# Patient Record
Sex: FEMALE | Race: WHITE | NOT HISPANIC OR LATINO | Employment: FULL TIME | URBAN - METROPOLITAN AREA
[De-identification: names, ages, dates, MRNs, and addresses within clinical notes are randomized per-mention and may not be internally consistent; named-entity substitution may affect disease eponyms.]

---

## 2017-01-05 ENCOUNTER — ALLSCRIPTS OFFICE VISIT (OUTPATIENT)
Dept: OTHER | Facility: OTHER | Age: 52
End: 2017-01-05

## 2017-01-09 ENCOUNTER — HOSPITAL ENCOUNTER (OUTPATIENT)
Dept: MAMMOGRAPHY | Facility: CLINIC | Age: 52
Discharge: HOME | End: 2017-01-09
Payer: COMMERCIAL

## 2017-01-09 DIAGNOSIS — Z12.31 ENCOUNTER FOR SCREENING MAMMOGRAM FOR MALIGNANT NEOPLASM OF BREAST: ICD-10-CM

## 2017-01-09 DIAGNOSIS — R92.8 OTHER ABNORMAL AND INCONCLUSIVE FINDINGS ON DIAGNOSTIC IMAGING OF BREAST: ICD-10-CM

## 2017-01-09 PROCEDURE — G0206 DX MAMMO INCL CAD UNI: HCPCS

## 2017-01-09 PROCEDURE — G0202 SCR MAMMO BI INCL CAD: HCPCS

## 2017-01-11 ENCOUNTER — ALLSCRIPTS OFFICE VISIT (OUTPATIENT)
Dept: OTHER | Facility: OTHER | Age: 52
End: 2017-01-11

## 2017-01-11 ENCOUNTER — GENERIC CONVERSION - ENCOUNTER (OUTPATIENT)
Dept: OTHER | Facility: OTHER | Age: 52
End: 2017-01-11

## 2017-01-12 ENCOUNTER — GENERIC CONVERSION - ENCOUNTER (OUTPATIENT)
Dept: OTHER | Facility: OTHER | Age: 52
End: 2017-01-12

## 2017-01-17 LAB
. (HISTORICAL): NORMAL

## 2017-01-26 ENCOUNTER — GENERIC CONVERSION - ENCOUNTER (OUTPATIENT)
Dept: OTHER | Facility: OTHER | Age: 52
End: 2017-01-26

## 2017-06-01 DIAGNOSIS — R92.1 MAMMOGRAPHIC CALCIFICATION FOUND ON DIAGNOSTIC IMAGING OF BREAST: ICD-10-CM

## 2017-08-01 ENCOUNTER — HOSPITAL ENCOUNTER (OUTPATIENT)
Dept: MAMMOGRAPHY | Facility: CLINIC | Age: 52
Discharge: HOME | End: 2017-08-01
Payer: COMMERCIAL

## 2017-08-01 DIAGNOSIS — R92.1 MAMMOGRAPHIC CALCIFICATION FOUND ON DIAGNOSTIC IMAGING OF BREAST: ICD-10-CM

## 2017-08-01 PROCEDURE — G0206 DX MAMMO INCL CAD UNI: HCPCS

## 2017-08-04 ENCOUNTER — GENERIC CONVERSION - ENCOUNTER (OUTPATIENT)
Dept: OTHER | Facility: OTHER | Age: 52
End: 2017-08-04

## 2018-01-01 DIAGNOSIS — R92.8 OTHER ABNORMAL AND INCONCLUSIVE FINDINGS ON DIAGNOSTIC IMAGING OF BREAST: ICD-10-CM

## 2018-01-01 DIAGNOSIS — N93.9 ABNORMAL UTERINE AND VAGINAL BLEEDING, UNSPECIFIED (CODE): ICD-10-CM

## 2018-01-01 DIAGNOSIS — R92.2 INCONCLUSIVE MAMMOGRAM: ICD-10-CM

## 2018-01-01 DIAGNOSIS — R92.1 MAMMOGRAPHIC CALCIFICATION FOUND ON DIAGNOSTIC IMAGING OF BREAST: ICD-10-CM

## 2018-01-01 DIAGNOSIS — Z12.31 ENCOUNTER FOR SCREENING MAMMOGRAM FOR MALIGNANT NEOPLASM OF BREAST: ICD-10-CM

## 2018-01-11 NOTE — MISCELLANEOUS
Message  Spoke with patient regarding R breast imaging and biopsy from yesterday  Microcalcifications are stable; follow-up diagnostic mammogram in 6 months  Changes to previously seen cystic lesion that may represent a solid mass instead  Core biopsy performed  Pathology pending  Patient states area is tender, but no signs of infection  We will call when pathology is resulted        Signatures   Electronically signed by : Abhishek Turner, [facility]; May 24 2016 12:32PM EST                       (Author)

## 2018-01-13 VITALS
SYSTOLIC BLOOD PRESSURE: 120 MMHG | DIASTOLIC BLOOD PRESSURE: 90 MMHG | WEIGHT: 157 LBS | BODY MASS INDEX: 26.8 KG/M2 | HEIGHT: 64 IN

## 2018-01-13 NOTE — MISCELLANEOUS
Message  Spoke with patient regarding R diagnostic mammogram results  Nodule seen previously and calcifications are stable, and most likely benign  Patient to have f/u diagnostic bilateral mammogram in 6 months at the time of her yearly GYN exam       Signatures   Electronically signed by : Bharath De La Torre, [facility];  Aug  4 2017  3:41PM EST                       (Author)

## 2018-01-15 NOTE — MISCELLANEOUS
Message  Patient called with questions regarding treatment for heavy menstrual bleeding  Per Dr Lluvia Mattson, patient can try Aygestin 5 mg BID or endometrial ablation  Patient states Dr Lluvia Mattson recommended endometrial ablation  Discussed basics of procedure with patient and possible risks  She would like to proceed  Referral entered for Dr Adalberto Parry, and patient given office number  She will call for consult        Signatures   Electronically signed by : YVONNE Jennings; Jan 26 2017 10:41AM EST                       (Author)

## 2018-01-16 NOTE — MISCELLANEOUS
Message  Spoke with patient regarding mammogram results  L breast benign  Calcifications of R breast stable  Repeat diagnostic mammogram of R breast in 6 months  Slip to be mailed to patient  We will call when endometrial biopsy results are returned  1        1 Amended By: Veena Hill; Jan 12 2017 1:19 PM EST    Plan  Breast calcifications    · MAMMO DIAGNOSTIC RIGHT W CAD; Status:Hold For - Scheduling; Requested  KJO:[id number];     Signatures   Electronically signed by : Pankaj Villanueva, [facility]; Jan 12 2017  1:19PM EST                       (Author)

## 2018-01-17 NOTE — MISCELLANEOUS
Message  Spoke with patient regarding R breast biopsy - benign fibrofatty tissue  Will have follow-up diagnostic mammogram of R breast in 6 months  Patient to make appt today for her yearly GYN exam in November Signatures   Electronically signed by : Petros Carmona, [facility]; May 26 2016  1:02PM EST                       (Author)

## 2018-01-19 ENCOUNTER — GENERIC CONVERSION - ENCOUNTER (OUTPATIENT)
Dept: OTHER | Facility: OTHER | Age: 53
End: 2018-01-19

## 2018-01-24 VITALS
BODY MASS INDEX: 29.26 KG/M2 | WEIGHT: 165.13 LBS | DIASTOLIC BLOOD PRESSURE: 80 MMHG | SYSTOLIC BLOOD PRESSURE: 128 MMHG | HEIGHT: 63 IN

## 2018-01-25 LAB
CYTOLOGIST CVX/VAG CYTO: NORMAL
DX ICD CODE: NORMAL
OTHER STN SPEC: NORMAL
OTHER STN SPEC: NORMAL
PATH REPORT.FINAL DX SPEC: NORMAL
SL AMB NOTE:: NORMAL
SL AMB SPECIMEN ADEQUACY: NORMAL